# Patient Record
Sex: FEMALE | Race: WHITE | NOT HISPANIC OR LATINO | Employment: FULL TIME | URBAN - METROPOLITAN AREA
[De-identification: names, ages, dates, MRNs, and addresses within clinical notes are randomized per-mention and may not be internally consistent; named-entity substitution may affect disease eponyms.]

---

## 2023-02-01 DIAGNOSIS — N64.4 BREAST PAIN, LEFT: ICD-10-CM

## 2023-02-01 DIAGNOSIS — Z91.89 AT HIGH RISK FOR BREAST CANCER: Primary | ICD-10-CM

## 2023-02-01 DIAGNOSIS — Z80.3 FAMILY HISTORY OF BREAST CANCER IN MOTHER: ICD-10-CM

## 2023-02-17 NOTE — PROGRESS NOTES
[facility] Breast Surg  Breast Surgical Oncology  New Patient Office Visit - H&P      Referring Provider: Dr. Robert Swenson  PCP: Carl Nunez MD     Chief Complaint:   Chief Complaint   Patient presents with    Breast Pain     New patient presented today for a High Risk visit due to left Breast Pain. Patient stated that breast pain comes and goes, pain felt today during exam. 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 Patient has no breast discharge, redness.      Subjective:     HPI:  Sunni Wilson is a 57 y.o. female who presents on 2023 for evaluation of  left breast pain radiating from the axilla to the nipple which has been present for a few years . She is very concerned about the pain because her mother had an aggressive form of breast cancer that spread very quickly. She had a US of the left axilla and axillary tail performed in [place], which was benign. Her risk lifetime risk for breast cancer according to the Tyrer-Cuzick model is 14.8% (v8).    She currently denies any breast issues including rashes, redness, pain, swelling, nipple discharge, or new lumps/masses.    Im[place]22 [place] [facility] [facility] BENIGN: Stable mild fibroglandular asymmetry in the UOQ of the right breast. No focal mass, suspicious microcalcification, or architectural distortion identified. Small and stable benign calcifications are noted bilaterally.     2023 L Axilla US including the Axillary tail of the breast to [place] [facility] [place] abnormality in the site of pain.No mass or fluid collection. No gross soft tissue edema. Small benign appearing axillary lymph nodes noted, largest measuring up to 1.4 cm. They have a benign appearing.     Pathology:  None    OB/GYN History:  Age at Menarche Onset: 14  Menopausal Status: postmenopausal, LMP: No LMP recorded. Patient has had a hysterectomy.  Hysterectomy/Oophorectomy: hysterectomy, at age 44  Hormonal birth control  (duration): for a few years  Pregnancy History:   Age at first live birth: 26  Hormone Replacement Therapy: Progesterone for a few days per month to start ovulation    Other:  # of breast biopsies (when and pathology results): none  MG breast density: Category B  Prior thoracic RT: none  Genetic testing: none  Ashkenazi Tenriism descent: No    Family History:  Family History   Problem Relation Age of Onset    Breast cancer Mother 53        recurrence to kidney/liver in late 50s    Intestinal polyp Father     Katerin-Pick disease Sister     Other Maternal Grandmother         liver issues (non cancerous)    Cirrhosis Maternal Grandfather     Other Paternal Grandmother         blood disease    Liver cancer Paternal Grandfather       Patient History:  Past Medical History:   Diagnosis Date    Menorrhagia     Psoriatic arthritis     Rectocele      Past Surgical History:   Procedure Laterality Date    HEEL SPUR EXCISION      HYSTERECTOMY         Social History     Socioeconomic History    Marital status:    Tobacco Use    Smoking status: Never    Smokeless tobacco: Never   Substance and Sexual Activity    Alcohol use: Never    Drug use: Never    Sexual activity: Yes     Partners: Male       There is no immunization history on file for this patient.    Medications/Allergies:    Current Outpatient Medications:     ascorbic acid, vitamin C, (VITAMIN C) 1000 MG tablet, Take 1,000 mg by mouth 2 (two) times daily., Disp: , Rfl:     cetirizine HCl (CETIRIZINE ORAL), Take by mouth., Disp: , Rfl:     cholecalciferol, vitamin D3, (VITAMIN D3) 25 mcg (1,000 unit) capsule, Take 1,000 Units by mouth once daily., Disp: , Rfl:     fluticasone propionate (FLONASE) 50 mcg/actuation nasal spray, SPRAY ONE SPRAY IN EACH NOSTRIL TWICE DAILY AS DIRECTED, Disp: , Rfl:     folic acid (FOLVITE) 1 MG tablet, Take 1,000 mcg by mouth., Disp: , Rfl:     guaifenesin/dextromethorphan (MUCINEX COUGH-CHEST CONGEST HB ORAL), Take by  mouth., Disp: , Rfl:     methotrexate 2.5 MG Tab, TAKE SIX TABLETS BY MOUTH EVERY WEDNESDAY, Disp: , Rfl:     sulfaSALAzine (AZULFIDINE) 500 mg Tab, Take 1,000 mg by mouth 2 (two) times daily., Disp: , Rfl:      Review of patient's allergies indicates:   Allergen Reactions    Aleve [naproxen sodium]     Benadryl allergy decongestant     Penicillins        Review of Systems:  Pertinent items are noted in HPI.     Objective:     Vitals:  Vitals:    02/22/23 1257   BP: 138/81   Pulse: 99   Resp: 18   Temp: 98 °F (36.7 °C)       Body mass index is 31.72 kg/m².     Physical Exam:  General: The patient is awake, alert and oriented times three. The patient is well nourished and in no acute distress.  Neck: There is no evidence of palpable cervical, supraclavicular or axillary adenopathy. The neck is supple. The thyroid is not enlarged.  Musculoskeletal: The patient has a normal range of motion of her bilateral upper extremities.  Chest: Examination of the chest wall fails to reveal any obvious abnormalities.  The lungs are clear to auscultation bilaterally without rales, rhonchi, or wheezing.  Cardiovascular: The heart has a regular rate and rhythm without murmurs, gallops or rubs.  Breast:   Right:  Examination of right breast fails to reveal any dominant masses or areas of significant focal nodularity. The nipple is everted without evidence of discharge. There is no skin dimpling with movement of the pectoralis. There is no significant skin changes overlying the breast.   Left: Tenderness to palpation in the left axilla radiation to the nipple. Examination of the left breast fails to reveal any dominant masses or areas of significant focal nodularity. The nipple is inverted without evidence of discharge. She states it has been inverted for as long as she can remember. There is no skin dimpling with movement of the pectoralis. There are no significant skin changes overlying the breast.  Abdomen: The abdomen is soft, flat,  nontender and nondistended with no palpable masses or organomegaly.  Integumentary: no rashes or skin lesions present  Neurologic: cranial nerves intact, no signs of peripheral neurological deficit, motor/sensory function intact    Assessment and Plan:     There is no problem list on file for this patient.       Sunni was seen today for breast pain.    Diagnoses and all orders for this visit:    Breast pain, left  -     Mammo Digital Diagnostic Left with Kwame; Future  -     US Breast Left Limited; Future    Family history of breast cancer in mother          Plan:     L DG MG and L Breast US to evaluate breast pain. Reassurance provided that breast pain is often benign and that her mammograms over the years have been stable, but we can work up further for reassurance.    I discussed ways to reduce breast pain/tenderness. Avoid caffeine (coffee, teas, chocolate, sodas). Drinking alcohol may also increase the risk of fibrocystic changes and breast pain. I also advised to wear a good, supportive bra both day and night when symptoms are worse. Avoid contact sports and other activities which could cause injury to the breasts.           All of her questions were answered. She was advised to call if she develops any questions or concerns.    Indira Howard PA-C        ----------------------------------------------------------------------------------------------------------------------------  Total time on the date of the visit ranged from 45-59 minutes (05517). Total time includes both face-to-face and non-face-to-face time personally spent by myself on the day of the visit.    Non-face-to-face time included:  _X_ preparing to see the patient such as reviewing the patient record  _X_ obtaining and reviewing separately obtained history  _X_ independently interpreting results  _X_ documenting clinical information in electronic health record.    Face-to-face time included:  _X_ performing an appropriate history and  examination  _X_ communicating results to the patient  _X_ counseling and educating the patient  __ ordering appropriate medications  _x_ ordering appropriate tests  _X_ ordering appropriate procedures (including follow-up)  _X_ answering any questions the patient had    Total Time spent on date of visit: 45 minutes

## 2023-02-22 ENCOUNTER — OFFICE VISIT (OUTPATIENT)
Dept: SURGERY | Facility: CLINIC | Age: 58
End: 2023-02-22
Payer: COMMERCIAL

## 2023-02-22 VITALS
RESPIRATION RATE: 18 BRPM | WEIGHT: 162.38 LBS | HEART RATE: 99 BPM | BODY MASS INDEX: 31.88 KG/M2 | HEIGHT: 60 IN | SYSTOLIC BLOOD PRESSURE: 138 MMHG | TEMPERATURE: 98 F | OXYGEN SATURATION: 98 % | DIASTOLIC BLOOD PRESSURE: 81 MMHG

## 2023-02-22 DIAGNOSIS — Z80.3 FAMILY HISTORY OF BREAST CANCER IN MOTHER: ICD-10-CM

## 2023-02-22 DIAGNOSIS — N64.4 BREAST PAIN, LEFT: Primary | ICD-10-CM

## 2023-02-22 PROCEDURE — 99999 PR PBB SHADOW E&M-EST. PATIENT-LVL V: ICD-10-PCS | Mod: PBBFAC,,, | Performed by: PHYSICIAN ASSISTANT

## 2023-02-22 PROCEDURE — 99204 OFFICE O/P NEW MOD 45 MIN: CPT | Mod: S$GLB,,, | Performed by: PHYSICIAN ASSISTANT

## 2023-02-22 PROCEDURE — 3079F DIAST BP 80-89 MM HG: CPT | Mod: CPTII,S$GLB,, | Performed by: PHYSICIAN ASSISTANT

## 2023-02-22 PROCEDURE — 3079F PR MOST RECENT DIASTOLIC BLOOD PRESSURE 80-89 MM HG: ICD-10-PCS | Mod: CPTII,S$GLB,, | Performed by: PHYSICIAN ASSISTANT

## 2023-02-22 PROCEDURE — 99204 PR OFFICE/OUTPT VISIT, NEW, LEVL IV, 45-59 MIN: ICD-10-PCS | Mod: S$GLB,,, | Performed by: PHYSICIAN ASSISTANT

## 2023-02-22 PROCEDURE — 3075F PR MOST RECENT SYSTOLIC BLOOD PRESS GE 130-139MM HG: ICD-10-PCS | Mod: CPTII,S$GLB,, | Performed by: PHYSICIAN ASSISTANT

## 2023-02-22 PROCEDURE — 3008F PR BODY MASS INDEX (BMI) DOCUMENTED: ICD-10-PCS | Mod: CPTII,S$GLB,, | Performed by: PHYSICIAN ASSISTANT

## 2023-02-22 PROCEDURE — 3008F BODY MASS INDEX DOCD: CPT | Mod: CPTII,S$GLB,, | Performed by: PHYSICIAN ASSISTANT

## 2023-02-22 PROCEDURE — 1159F PR MEDICATION LIST DOCUMENTED IN MEDICAL RECORD: ICD-10-PCS | Mod: CPTII,S$GLB,, | Performed by: PHYSICIAN ASSISTANT

## 2023-02-22 PROCEDURE — 3075F SYST BP GE 130 - 139MM HG: CPT | Mod: CPTII,S$GLB,, | Performed by: PHYSICIAN ASSISTANT

## 2023-02-22 PROCEDURE — 1159F MED LIST DOCD IN RCRD: CPT | Mod: CPTII,S$GLB,, | Performed by: PHYSICIAN ASSISTANT

## 2023-02-22 PROCEDURE — 99999 PR PBB SHADOW E&M-EST. PATIENT-LVL V: CPT | Mod: PBBFAC,,, | Performed by: PHYSICIAN ASSISTANT

## 2023-02-22 RX ORDER — IBUPROFEN 100 MG/5ML
1000 SUSPENSION, ORAL (FINAL DOSE FORM) ORAL 2 TIMES DAILY
COMMUNITY

## 2023-02-22 RX ORDER — VIT C/E/ZN/COPPR/LUTEIN/ZEAXAN 250MG-90MG
1000 CAPSULE ORAL DAILY
COMMUNITY